# Patient Record
Sex: MALE | Race: WHITE
[De-identification: names, ages, dates, MRNs, and addresses within clinical notes are randomized per-mention and may not be internally consistent; named-entity substitution may affect disease eponyms.]

---

## 2018-04-20 ENCOUNTER — HOSPITAL ENCOUNTER (EMERGENCY)
Dept: HOSPITAL 97 - ER | Age: 68
LOS: 1 days | Discharge: LEFT BEFORE DISCHARGE | End: 2018-04-21
Payer: COMMERCIAL

## 2018-04-20 DIAGNOSIS — R05: ICD-10-CM

## 2018-04-20 DIAGNOSIS — D72.829: ICD-10-CM

## 2018-04-20 DIAGNOSIS — Z91.040: ICD-10-CM

## 2018-04-20 DIAGNOSIS — Z85.118: ICD-10-CM

## 2018-04-20 DIAGNOSIS — R06.00: Primary | ICD-10-CM

## 2018-04-20 DIAGNOSIS — R09.02: ICD-10-CM

## 2018-04-20 LAB
ALBUMIN SERPL BCP-MCNC: 3.5 G/DL (ref 3.2–5.5)
ALP SERPL-CCNC: 56 IU/L (ref 42–121)
ALT SERPL W P-5'-P-CCNC: 9 IU/L (ref 10–60)
AST SERPL W P-5'-P-CCNC: 25 IU/L (ref 10–42)
BUN BLD-MCNC: 11 MG/DL (ref 6–20)
CKMB CREATINE KINASE MB: 1.2 NG/ML (ref 0.3–4)
GLUCOSE SERPLBLD-MCNC: 142 MG/DL (ref 65–120)
HCT VFR BLD CALC: 35 % (ref 39.6–49)
INR BLD: 1.15
LIPASE SERPL-CCNC: 24 U/L (ref 22–51)
LYMPHOCYTES # SPEC AUTO: 3 K/UL (ref 0.7–4.9)
MAGNESIUM SERPL-MCNC: 1.8 MG/DL (ref 1.8–2.5)
MCH RBC QN AUTO: 25.6 PG (ref 27–35)
MCV RBC: 82.7 FL (ref 80–100)
PMV BLD: 8.7 FL (ref 7.6–11.3)
POTASSIUM SERPL-SCNC: 3.7 MEQ/L (ref 3.6–5)
RBC # BLD: 4.23 M/UL (ref 4.33–5.43)

## 2018-04-20 PROCEDURE — 94640 AIRWAY INHALATION TREATMENT: CPT

## 2018-04-20 PROCEDURE — 36415 COLL VENOUS BLD VENIPUNCTURE: CPT

## 2018-04-20 PROCEDURE — 80048 BASIC METABOLIC PNL TOTAL CA: CPT

## 2018-04-20 PROCEDURE — 71045 X-RAY EXAM CHEST 1 VIEW: CPT

## 2018-04-20 PROCEDURE — 85610 PROTHROMBIN TIME: CPT

## 2018-04-20 PROCEDURE — 82550 ASSAY OF CK (CPK): CPT

## 2018-04-20 PROCEDURE — 93005 ELECTROCARDIOGRAM TRACING: CPT

## 2018-04-20 PROCEDURE — 85730 THROMBOPLASTIN TIME PARTIAL: CPT

## 2018-04-20 PROCEDURE — 83690 ASSAY OF LIPASE: CPT

## 2018-04-20 PROCEDURE — 80076 HEPATIC FUNCTION PANEL: CPT

## 2018-04-20 PROCEDURE — 96361 HYDRATE IV INFUSION ADD-ON: CPT

## 2018-04-20 PROCEDURE — 83880 ASSAY OF NATRIURETIC PEPTIDE: CPT

## 2018-04-20 PROCEDURE — 99284 EMERGENCY DEPT VISIT MOD MDM: CPT

## 2018-04-20 PROCEDURE — 87040 BLOOD CULTURE FOR BACTERIA: CPT

## 2018-04-20 PROCEDURE — 85025 COMPLETE CBC W/AUTO DIFF WBC: CPT

## 2018-04-20 PROCEDURE — 82553 CREATINE MB FRACTION: CPT

## 2018-04-20 PROCEDURE — 83735 ASSAY OF MAGNESIUM: CPT

## 2018-04-20 PROCEDURE — 84484 ASSAY OF TROPONIN QUANT: CPT

## 2018-04-20 PROCEDURE — 96374 THER/PROPH/DIAG INJ IV PUSH: CPT

## 2018-04-20 NOTE — EDPHYS
Physician Documentation                                                                           

 [facility]                                                                

Name: Trell Yepez                                                                                 

Age: 67 yrs                                                                                       

Sex: Male                                                                                         

: 1950                                                                                   

MRN: [id number]                                                                                   

Arrival Date: 2018                                                                          

Time: 21:55                                                                                       

Account#: [id number]                                                                            

Bed 2                                                                                             

Private MD:                                                                                       

ED Physician Pedro Keyes                                                                      

HPI:                                                                                              

                                                                                             

22:11 This 67 yrs old  Male presents to ER via Wheelchair with complaints of         brown 

      Breathing Difficulty.                                                                       

22:11 The patient has shortness of breath at rest, with light activity. Onset: The            brown 

      symptoms/episode began/occurred just prior to arrival. Duration: The symptoms are           

      continuous, and are steadily getting worse. The patient's shortness of breath has no        

      apparent modifying factors. Associated signs and symptoms: Pertinent positives:             

      non-productive cough. Severity of symptoms: At their worst the symptoms were moderate       

      in the emergency department the symptoms are unchanged.                                     

                                                                                                  

Historical:                                                                                       

- Allergies:                                                                                      

22:05 Latex, Natural Rubber;                                                                  fc  

- Home Meds:                                                                                      

22:05 Albuterol Inhl [Active]; ipratropium bromide inhalation inhalation [Active];            fc  

- PMHx:                                                                                           

22:05 colon cancer; lung cancer;                                                              fc  

- PSHx:                                                                                           

22:05 pulmonary stent;                                                                        fc  

                                                                                                  

- Immunization history:: Adult Immunizations up to date.                                          

- Social history:: Smoking status: Patient/guardian denies using tobacco.                         

- Family history:: not pertinent.                                                                 

                                                                                                  

                                                                                                  

ROS:                                                                                              

22:11 Constitutional: Negative for fever, chills, and weight loss, Eyes: Negative for injury, brown 

      pain, redness, and discharge, ENT: Negative for injury, pain, and discharge, Neck:          

      Negative for injury, pain, and swelling, Cardiovascular: Negative for chest pain,           

      palpitations, and edema, Abdomen/GI: Negative for abdominal pain, nausea, vomiting,         

      diarrhea, and constipation, Back: Negative for injury and pain, : Negative for            

      injury, bleeding, discharge, and swelling, MS/Extremity: Negative for injury and            

      deformity, Skin: Negative for injury, rash, and discoloration, Neuro: Negative for          

      headache, weakness, numbness, tingling, and seizure, Psych: Negative for depression,        

      anxiety, suicide ideation, homicidal ideation, and hallucinations, Allergy/Immunology:      

      Negative for hives, rash, and allergies, Endocrine: Negative for neck swelling,             

      polydipsia, polyuria, polyphagia, and marked weight changes, Hematologic/Lymphatic:         

      Negative for swollen nodes, abnormal bleeding, and unusual bruising.                        

22:11 Respiratory: Positive for cough, shortness of breath, wheezing, inspiratory, expiratory.    

                                                                                                  

Exam:                                                                                             

22:11 Constitutional:  This is a well developed, well nourished patient who is awake, alert,  brown 

      and in no acute distress. Head/Face:  Normocephalic, atraumatic. Eyes:  Pupils equal        

      round and reactive to light, extra-ocular motions intact.  Lids and lashes normal.          

      Conjunctiva and sclera are non-icteric and not injected.  Cornea within normal limits.      

      Periorbital areas with no swelling, redness, or edema. ENT:  Nares patent. No nasal         

      discharge, no septal abnormalities noted.  Tympanic membranes are normal and external       

      auditory canals are clear.  Oropharynx with no redness, swelling, or masses, exudates,      

      or evidence of obstruction, uvula midline.  Mucous membranes moist. Neck:  Trachea          

      midline, no thyromegaly or masses palpated, and no cervical lymphadenopathy.  Supple,       

      full range of motion without nuchal rigidity, or vertebral point tenderness.  No            

      Meningismus. Chest/axilla:  Normal chest wall appearance and motion.  Nontender with no     

      deformity.  No lesions are appreciated. Cardiovascular:  Regular rate and rhythm with a     

      normal S1 and S2.  No gallops, murmurs, or rubs.  Normal PMI, no JVD.  No pulse             

      deficits. Abdomen/GI:  Soft, non-tender, with normal bowel sounds.  No distension or        

      tympany.  No guarding or rebound.  No evidence of tenderness throughout. Back:  No          

      spinal tenderness.  No costovertebral tenderness.  Full range of motion. Male :           

      Normal genitalia with no discharge or lesions. Skin:  Warm, dry with normal turgor.         

      Normal color with no rashes, no lesions, and no evidence of cellulitis. MS/ Extremity:      

      Pulses equal, no cyanosis.  Neurovascular intact.  Full, normal range of motion. Neuro:     

       Awake and alert, GCS 15, oriented to person, place, time, and situation.  Cranial          

      nerves II-XII grossly intact.  Motor strength 5/5 in all extremities.  Sensory grossly      

      intact.  Cerebellar exam normal.  Normal gait. Psych:  Awake, alert, with orientation       

      to person, place and time.  Behavior, mood, and affect are within normal limits.            

22:11 Respiratory: mild respiratory distress is noted, moderate respiratory distress is           

      noted,  Respirations: labored breathing, that is mild, that is moderate, Breath sounds:     

      decreased breath sounds, rhonchi, wheezing: expiratory Respiratory rate:  38                

                                                                                                  

Vital Signs:                                                                                      

22:05  / 96; Pulse 93; Resp 38; Temp 98.6; Pulse Ox 91% on 2 lpm NC; Weight 92.53 kg;   fc  

      Height 6 ft. 0 in. (182.88 cm); Pain 0/10;                                                  

22:40  / 93; Pulse 91; Resp 22; Pulse Ox 100% on Nebulizer Mask;                        tl2 

23:03  / 61; Pulse 86; Resp 22; Pulse Ox 98% on 4 lpm NC;                               tl2 

23:48 BP 91 / 68; Pulse 79; Resp 20; Pulse Ox 99% on 4 lpm NC;                                tl2 

22:05 Body Mass Index 27.67 (92.53 kg, 182.88 cm)                                             fc  

                                                                                                  

MDM:                                                                                              

22:06 Patient medically screened.                                                             [facility] 

22:14 Data reviewed: vital signs, nurses notes, lab test result(s), EKG, radiologic studies,  brown 

      plain films.                                                                                

                                                                                                  

                                                                                             

22:11 Order name: Basic Metabolic Panel; Complete Time: 23:14                                 [facility] 

                                                                                             

22:11 Order name: BNP; Complete Time: 23:14                                                   [facility] 

                                                                                             

22:11 Order name: CBC with Diff; Complete Time: 23:14                                         [facility] 

                                                                                             

22:11 Order name: Ckmb; Complete Time: 23:14                                                  [facility] 

                                                                                             

22:11 Order name: CPK; Complete Time: 23:14                                                   [facility] 

                                                                                             

22:11 Order name: LFT's; Complete Time: 23:14                                                 [facility] 

                                                                                             

22:11 Order name: Magnesium; Complete Time: 23:14                                             [facility] 

                                                                                             

22:11 Order name: PT-INR; Complete Time: 23:14                                                [facility] 

                                                                                             

22:11 Order name: Ptt, Activated; Complete Time: 23:14                                        [facility] 

                                                                                             

22:11 Order name: Troponin (emerg Dept Use Only); Complete Time: 23:14                        [facility] 

                                                                                             

22:11 Order name: XRAY Chest (1 view)                                                         [facility] 

                                                                                             

22:11 Order name: Lipase; Complete Time: 23:14                                                [facility] 

                                                                                             

22:11 Order name: BIPAP                                                                       [facility] 

                                                                                             

22:11 Order name: Blood Culture Adult (2)                                                     [facility] 

                                                                                             

22:11 Order name: EKG; Complete Time: 22:12                                                   [facility] 

                                                                                             

22:11 Order name: Cardiac monitoring; Complete Time: 22:39                                    [facility] 

                                                                                             

22:11 Order name: EKG - Nurse/Tech; Complete Time: 22:39                                      [facility] 

                                                                                             

22:11 Order name: IV Saline Lock; Complete Time: 22:40                                        [facility] 

                                                                                             

22:11 Order name: Labs collected and sent; Complete Time: 22:40                               [facility] 

                                                                                             

22:11 Order name: O2 Per Protocol; Complete Time: :40                                       [facility] 

                                                                                             

22:11 Order name: O2 Sat Monitoring; Complete Time: 22:40                                     [facility] 

                                                                                                  

Administered Medications:                                                                         

22:16 Drug: Albuterol - atroVENT (3:1) (2.5 mg - 0.5 mg) 3 ml Route: Nebulizer;               tl1 

                                                                                             

00:17 Follow up: Response: Marked relief of symptoms                                          tl2 

                                                                                             

22:35 Drug: NS 0.9% 500 ml Route: IV; Rate: bolus; Site: left antecubital;                    tl2 

23:33 Follow up: IV Status: Completed infusion; IV Intake: 500ml                              tl2 

22:35 Drug: SOLU-Medrol 125 mg Route: IVP; Site: left antecubital;                            tl2 

                                                                                             

00:17 Follow up: Response: No adverse reaction; Marked relief of symptoms                     tl2 

                                                                                             

23:11 Not Given (Patient Refused): levofloxacin 500 mg 100 ml IVPB once over 60 mins          tl1 

23:11 Not Given (Patient Refused): Cefepime 2 grams IVPB at 200 ml/hr once over 30 mins; (mix tl1 

      in  mL)                                                                               

23:33 Drug: NS 0.9% 1000 ml Route: IV; Rate: 125 ml/hr; Site: left antecubital;               tl2 

                                                                                             

00:18 Follow up: IV Status: Completed infusion                                                tl2 

                                                                                                  

                                                                                                  

Disposition:                                                                                      

18 23:59 Patient has left against medical advice. Impression: Dyspnea, Cough, Hypoxemia,    

  Elevated white blood cell count. - Patients states they are going to Home.                      

- Condition is Undetermined.                                                                      

                                                                                                  

                                                                                                  

                                                                                                  

Follow up: Private Physician; When: Upon discharge from the Emergency Department;                 

  Reason: Recheck today's complaints, Continuance of care, Re-evaluation by your                  

  physician.                                                                                      

- Problem is new.                                                                                 

- Symptoms have improved.                                                                         

                                                                                                  

                                                                                                  

                                                                                                  

Signatures:                                                                                       

Dispatcher [employer]                           Pedro Underwood MD MD cha Chretien, Felicia, RN                   RN   fc                                                   

Gabriela Ayala, RN                      RN   tl1                                                  

Heidy Lainez, RN                        RN   tl2                                                  

                                                                                                  

**************************************************************************************************

## 2018-04-20 NOTE — ER
Nurse's Notes                                                                                     

 [facility]                                                                

Name: Trell Yepez                                                                                 

Age: 67 yrs                                                                                       

Sex: Male                                                                                         

: 1950                                                                                   

MRN: [id number]                                                                                   

Arrival Date: 2018                                                                          

Time: 21:55                                                                                       

Account#: [id number]                                                                            

Bed 2                                                                                             

Private MD:                                                                                       

Diagnosis: Dyspnea;Cough;Hypoxemia;Elevated white blood cell count                                

                                                                                                  

Presentation:                                                                                     

                                                                                             

22:00 Presenting complaint: Patient states: Respiratory distress that started suddenly 15 min aj  

      PTA. Patient is retracting in distress, skin is diaphoretic. Patient is able to speak       

      and reports that he believes his pulmonary stent, placed 3 weeks ago, is obsrtucted.        

      Transition of care: patient was not received from another setting of care. Onset of         

      symptoms was 2018. Initial Sepsis Screen: Does the patient meet any 2             

      criteria? No. Patient's initial sepsis screen is negative. Does the patient have a          

      suspected source of infection? No. Patient's initial sepsis screen is negative. Care        

      prior to arrival: None.                                                                     

22:00 Method Of Arrival: Wheelchair                                                           aj  

22:00 Acuity: ALLISON 2                                                                           aj  

                                                                                                  

Triage Assessment:                                                                                

22:05 General: Appears in no apparent distress. uncomfortable, Behavior is anxious. Pain:     fc  

      Denies pain. Neuro: Level of Consciousness is awake, alert, obeys commands, Oriented to     

      person, place, time, situation. Respiratory: Reports shortness of breath labored            

      breathing Airway is compromised Trachea midline Respiratory effort is labored, gasping,     

      with retractions, using tripod position, Respiratory pattern is tachypnea Onset: The        

      symptoms/episode began/occurred just prior to arrival, the patient has severe shortness     

      of breath. Derm: Skin is healthy with good turgor, Skin is diaphoretic, Skin is normal.     

                                                                                                  

Historical:                                                                                       

- Allergies:                                                                                      

22:05 Latex, Natural Rubber;                                                                  fc  

- Home Meds:                                                                                      

22:05 Albuterol Inhl [Active]; ipratropium bromide inhalation inhalation [Active];            fc  

- PMHx:                                                                                           

22:05 colon cancer; lung cancer;                                                              fc  

- PSHx:                                                                                           

22:05 pulmonary stent;                                                                        fc  

                                                                                                  

- Immunization history:: Adult Immunizations up to date.                                          

- Social history:: Smoking status: Patient/guardian denies using tobacco.                         

- Family history:: not pertinent.                                                                 

                                                                                                  

                                                                                                  

Screenin:10 Abuse screen: Denies threats or abuse. Nutritional screening: No deficits noted.        tl2 

      Tuberculosis screening: No symptoms or risk factors identified. Fall Risk                   

                                                                                                  

Assessment:                                                                                       

22:10 General: Appears distressed, uncomfortable, Behavior is cooperative, appropriate for    tl2 

      age, anxious. Pain: Denies pain. Cardiovascular: Denies chest pain, Heart tones S1 S2       

      present. Respiratory: Airway is patent Respiratory effort is labored, using tripod          

      position, Respiratory pattern is symmetrical, tachypnea Stridor noted Breath sounds         

      with wheezes bilaterally. Respiratory: Onset: The symptoms/episode began/occurred just      

      prior to arrival, the patient has severe shortness of breath. GI: No signs and/or           

      symptoms were reported involving the gastrointestinal system. : No signs and/or           

      symptoms were reported regarding the genitourinary system. Derm: Skin is clammy, Skin       

      is normal, Skin temperature is cool.                                                        

22:40 Reassessment: Patient appears in no apparent distress at this time. Patient and/or      tl2 

      family updated on plan of care and expected duration. Pain level reassessed. Patient        

      states feeling better. Patient states symptoms have improved. Respiratory: Airway is        

      patent Respiratory effort is even, labored, Respiratory pattern is symmetrical, Breath      

      sounds with rhonchi in right upper lobe and left upper lobe Breath sounds with wheezes      

      bilaterally.                                                                                

22:53 Reassessment: [facility] agreed [facility]. Transfer initiated.                    tl2 

23:48 Reassessment: Pt stated that he has changed his mind and would like to go home. Pt      tl2 

      states he feels better and that they won't do anything different in the [facility]. Pt              

      verbalized understanding of the risks of leaving AMA. MD notified and form signed.          

                                                                                                  

Vital Signs:                                                                                      

22:05  / 96; Pulse 93; Resp 38; Temp 98.6; Pulse Ox 91% on 2 lpm NC; Weight 92.53 kg;   fc  

      Height 6 ft. 0 in. (182.88 cm); Pain 0/10;                                                  

22:40  / 93; Pulse 91; Resp 22; Pulse Ox 100% on Nebulizer Mask;                        tl2 

23:03  / 61; Pulse 86; Resp 22; Pulse Ox 98% on 4 lpm NC;                               tl2 

23:48 BP 91 / 68; Pulse 79; Resp 20; Pulse Ox 99% on 4 lpm NC;                                tl2 

22:05 Body Mass Index 27.67 (92.53 kg, 182.88 cm)                                               

                                                                                                  

ED Course:                                                                                        

21:55 Patient arrived in ED.                                                                  es  

22:03 Triage completed.                                                                       aj  

22:05 Arm band placed on left wrist. Patient placed in an exam room, on a stretcher.          fc  

22:06 Pedro Keyes MD is Attending Physician.                                             brown 

22:06 Inserted saline lock: 20 gauge in right antecubital area, using aseptic technique.      tl2 

      Blood collected.                                                                            

22:10 Patient has correct armband on for positive identification. Bed in low position. Call   tl2 

      light in reach. Side rails up X2. Adult w/ patient.                                         

22:19 EKG done, by ED staff, reviewed by Pedro Keyes MD.                                   cb2 

22:23 X-ray completed. Portable x-ray completed in exam room. Patient tolerated procedure     kp1 

      well.                                                                                       

22:25 XRAY Chest (1 view) In Process Unspecified.                                             EDMS

22:40 First set of blood cultures drawn. Inserted saline lock: 20 gauge in left antecubital   tl2 

      area, using aseptic technique. Blood collected.                                             

22:53 Heidy Lainez, RN is Primary Nurse.                                                      tl2 

                                                                                             

00:16 No provider procedures requiring assistance completed. IV discontinued, intact,         tl2 

      bleeding controlled, No redness/swelling at site. Pressure dressing applied.                

                                                                                                  

Administered Medications:                                                                         

                                                                                             

22:16 Drug: Albuterol - atroVENT (3:1) (2.5 mg - 0.5 mg) 3 ml Route: Nebulizer;               tl1 

                                                                                             

00:17 Follow up: Response: Marked relief of symptoms                                          tl2 

                                                                                             

22:35 Drug: NS 0.9% 500 ml Route: IV; Rate: bolus; Site: left antecubital;                    tl2 

23:33 Follow up: IV Status: Completed infusion; IV Intake: 500ml                              tl2 

22:35 Drug: SOLU-Medrol 125 mg Route: IVP; Site: left antecubital;                            tl2 

                                                                                             

00:17 Follow up: Response: No adverse reaction; Marked relief of symptoms                     tl2 

                                                                                             

23:11 Not Given (Patient Refused): levofloxacin 500 mg 100 ml IVPB once over 60 mins          tl1 

23:11 Not Given (Patient Refused): Cefepime 2 grams IVPB at 200 ml/hr once over 30 mins; (mix tl1 

      in  mL)                                                                               

23:33 Drug: NS 0.9% 1000 ml Route: IV; Rate: 125 ml/hr; Site: left antecubital;               tl2 

                                                                                             

00:18 Follow up: IV Status: Completed infusion                                                tl2 

                                                                                                  

                                                                                                  

Intake:                                                                                           

                                                                                             

23:33 IV: 500ml; Total: 500ml.                                                                tl2 

                                                                                                  

Outcome:                                                                                          

22:35 ER care complete, transfer ordered by MD. dasilva 

                                                                                             

00:16 AMA AMA form signed                                                                     tl2 

      Condition: stable                                                                           

      Discharge instructions given to patient, family, Instructed on risks of signing out AMA     

00:19 Patient left the ED.                                                                    tl2 

                                                                                                  

Signatures:                                                                                       

Dispatcher [employer]                           Rissa Hatch RN                       Pedro Cornejo MD MD cha Salyer, Edna es Chretien, Felicia RN                   RN   Gabriela Allen RN                      RN   tl1                                                  

Heidy Lainez RN                        RN   tl2                                                  

Keshawn Whitmore Kathy                                 [facility]                                                  

                                                                                                  

**************************************************************************************************

## 2018-04-21 NOTE — RAD REPORT
EXAM DESCRIPTION:  Letitia Single View4/20/2018 10:25 pm

 

CLINICAL HISTORY:  Cough

 

COMPARISON:  none

 

FINDINGS:  An opacity is present within the right lung base with volume loss.

 

An 8 millimeter nodular opacity overlies the left lung base. The heart is normal size

 

IMPRESSION:  Right basilar opacity may represent pneumonia or a mass. Further evaluation with CT may 
be helpful.

 

Right lung volume loss could represent atelectasis or postsurgical change.

 

8 millimeter nodular opacity within the left base may represent a pulmonary nodule or nipple shadow.

## 2018-04-21 NOTE — EKG
Test Date:    2018-04-20               Test Time:    22:15:52

Technician:   IVONNE                                     

                                                     

MEASUREMENT RESULTS:                                       

Intervals:                                           

Rate:         98                                     

NH:           156                                    

QRSD:         90                                     

QT:           372                                    

QTc:          474                                    

Axis:                                                

P:            13                                     

NH:           156                                    

QRS:          12                                     

T:            45                                     

                                                     

INTERPRETIVE STATEMENTS:                                       

                                                     

Sinus rhythm with occasional premature ventricular complexes

Nonspecific ST abnormality

Abnormal ECG

No previous ECG available for comparison



Electronically Signed On 04-21-18 07:14:55 CDT by Suresh Torrez